# Patient Record
Sex: FEMALE | Race: WHITE
[De-identification: names, ages, dates, MRNs, and addresses within clinical notes are randomized per-mention and may not be internally consistent; named-entity substitution may affect disease eponyms.]

---

## 2018-11-06 ENCOUNTER — HOSPITAL ENCOUNTER (OUTPATIENT)
Dept: HOSPITAL 61 - PCVCIMAG | Age: 73
Discharge: HOME | End: 2018-11-06
Attending: INTERNAL MEDICINE
Payer: MEDICARE

## 2018-11-06 DIAGNOSIS — R06.00: ICD-10-CM

## 2018-11-06 DIAGNOSIS — I08.3: Primary | ICD-10-CM

## 2018-11-06 DIAGNOSIS — I48.0: ICD-10-CM

## 2018-11-06 DIAGNOSIS — Z87.891: ICD-10-CM

## 2018-11-06 DIAGNOSIS — R00.2: ICD-10-CM

## 2018-11-06 PROCEDURE — 78452 HT MUSCLE IMAGE SPECT MULT: CPT

## 2018-11-06 PROCEDURE — A9500 TC99M SESTAMIBI: HCPCS

## 2018-11-06 PROCEDURE — 93306 TTE W/DOPPLER COMPLETE: CPT

## 2018-11-06 PROCEDURE — 93017 CV STRESS TEST TRACING ONLY: CPT

## 2018-11-06 NOTE — PCVCIMAG
--------------- APPROVED REPORT --------------





Study performed:  11/06/2018 07:25:27



EXAM: Comprehensive 2D, Doppler, and color-flow 

Echocardiogram

Patient Location: Echo lab

Status:  routine



BSA:         2.06

HR: 67 bpmBP:          118/78 mmHg

Rhythm: NSR



Other Information 

Study Quality: Adequate



Risk Factors: 

Cardiac Risk Factors:  Hyperlipidemia



Indications

Dyspnea 

Palpitations



2D Dimensions

IVSd:  9.51 (7-11mm)

LVDd:  45.05 mm

PWd:  10.68 (7-11mm)

LVDs:  29.20 (25-40mm)

Left Atrium:  38.86 (27-40mm)

Aortic Root:  29.72 mm

LV Single Plane 4CH:  57.86 %

LV Single Plane 2CH:  56.25 %

Biplane EF:  57.4 %



Volumes

Left Atrial Volume (Systole)

Single Plane 4CH:  91.00 mLSingle Plane 2CH:  79.90 mL

LA ESV Index:  43.00 mL/m2



Aortic Valve

AoV Peak Diaz.:  1.39 m/s

AO Peak Gr.:  7.73 mmHgLVOT Max PG:  3.44 mmHg

LVOT Max V:  0.93 m/s



Mitral Valve

E/A Ratio:  1.6

MV Decel. Time:  229.96 ms

MV E Max Diaz.:  0.53 m/s

MV A Diaz.:  0.33 m/s

IVRT:  114.19 ms



Pulmonary Valve

PV Peak Diaz.:  1.00 m/sPV Peak Gr.:  4.03 mmHg



Pulmonary Vein

P Vein S:    0.29 m/sP Vein A:  0.27 m/s

P Vein D:   0.46 m/sP Vein A Dur.:  96.9 msec

P Vein S/D Ratio:  0.63



Tricuspid Valve

TR Peak Diaz.:  2.55 m/s

TR Peak Gr.:  26.07 mmHg



Left Ventricle

The left ventricle is normal size. There is normal LV segmental wall 

motion. There is normal left ventricular wall thickness. Left 

ventricular systolic function is normal. The left ventricular 

ejection fraction is within the normal range. LVEF is 55%. Grade II - 

pseudonormal filling dynamics.



Right Ventricle

Right ventricle is mildly dilated. The right ventricular systolic 

function is normal.



Atria

Left atrium is moderately dilated. Atrial septal aneurysm is present 

without PFO. Right atrium is mildly dilated.



Aortic Valve

Mild aortic valve sclerosis. Mild aortic regurgitation is present. 

There is no aortic valvular stenosis.



Mitral Valve

The mitral valve is normal in structure. There is mild mitral valve 

regurgitation noted. No evidence of mitral valve stenosis.



Tricuspid Valve

The tricuspid valve is normal in structure. Mild tricuspid 

regurgitation with PAP of 33 mmHg.



Pulmonic Valve

The pulmonary valve is normal in structure. There is mild pulmonic 

valvular regurgitation.



Great Vessels

The aortic root is normal in size. IVC is normal in size and 

collapses &gt;50% with inspiration.



Pericardium

There is no pericardial effusion. There is no pleural 

effusion.



&lt;Conclusion&gt;

The left ventricle is normal size.

LVEF is 55%.

Right ventricle is mildly dilated.

Left atrium is moderately dilated.

Right atrium is mildly dilated.

Atrial septal aneurysm is present without PFO.

Mild aortic valve sclerosis.

Mild aortic regurgitation is present.

The mitral valve is normal in structure.

The tricuspid valve is normal in structure.

Mild tricuspid regurgitation with PAP of 33 mmHg.

The pulmonary valve is normal in structure.

There is mild pulmonic valvular regurgitation.

There is no pericardial effusion.

## 2018-11-07 NOTE — PCVCIMAG
--------------- APPROVED REPORT --------------





Imaging Protocol: Rest Tc-99m/Stress Tc-99m 1 day

Study performed:  11/06/2018 09:27:15



Indication: Palpitations, Afib

Patient Location: Out-Patient

Stress Nurse: Alicia Bryan RN

NM Tech:Nickolas Vasquez NMTCB



Ht: 5 ft 6 in Wt: 225 lbs BSA:  2.10 m2

HR: 71 bpm                      BP: 120/66 mmHg         BMI:  

36.3

Rhythm:  Normal Sinus Rhythm



Medical History

Medical History: Age,Hyperlipidemia, Afib, Former Smoker



Resting Data

Rest SPECT myocardial perfusion imaging was performed in supine 

position 45 minutes following the intravenous injection of 11.9 mCi 

of Tc-99m Sestamibi.

Time of rest injection: 0830     Date: 11/06/2018

Administration Route: IV

Administration Site: Right AC



Pharmacologic Stress

Pharmacologic stress test was performed by injecting Regadenoson 0.4 

mg IV push over 10-15 seconds immediately followed by the intravenous 

injection of 30.9 mCi of Tc-99m Sestamibi.

Time of stress injection: 1015     Date: 11/06/2018

Administration Route: IV

Administration Site: Right AC

Gated Stress SPECT was performed 45 minutes after stress 

injection.

The images were gated to evaluate regional wall motion and calculate 

left ventricular ejection fraction. 



Stress Test Details

Stress Test:  Pharmacologic stress testing performed using 0.4 mg of 

regadenoson per 5 mL given IV over 10 seconds.

  Reason for pharmacologic stress test: Athritis in back and 

feet.



HRMax Heart Rate (APMHR): 147 bpm 

Resting HR:            71 bpmTarget HR (85% APMHR): 124 bpm

Max HR Achieved:  98 bpm

% of APMHR:         66

Recovery HR:            84 bpm



BP

Resting BP:  120/66 mmHg



Recovery BP:       113/55 mmHg

ECG

Resting ECG:  Normal Sinus Rhythm

Stress ECG:     Sinus Rhythm

Arrhythmia:    PAC's, PVC's

Recovery ECG: Sinus Rhythm



Clinical

Reason for Termination: Completed protocol

Stress Symptoms: Dyspnea

Symptoms resolved during recovery.



Stress ECG Conclusion

1. Adequate response to intravenous Lexiscan

2. Inadequate heart rate for ECG diagnosis



Study Data

Post stress, the left ventricular ejection was 71%..

SSS: 11

SRS: 9

SDS: 3

TID = 0.70.



Perfusion

There is a large area of moderately reduced uptake in the basal and 

mid segment of the inferior wall which is seen on the stress images 

as well as the resting images.

This area thickens and moves normally and is most consistent with 

attenuation artifact.



Nuclear Conclusion

ECG Findings: non-diagnostic 

Clinical Findings: negative for ischemia 

Nuclear Findings: negative for ischemia 

Exercise Capacity: not assessed

Left Ventricular Function: normal 

1. Low risk study 



Interpreted by:  Lexy Ronquillo MD

Electronically Approved: 11/07/2018 10:28:36



&lt;Conclusion&gt;

1. Adequate response to intravenous Lexiscan

2. Inadequate heart rate for ECG diagnosis

## 2018-11-27 ENCOUNTER — HOSPITAL ENCOUNTER (OUTPATIENT)
Dept: HOSPITAL 61 - PCVCCLINIC | Age: 73
Discharge: HOME | End: 2018-11-27
Attending: INTERNAL MEDICINE
Payer: MEDICARE

## 2018-11-27 DIAGNOSIS — Z87.891: ICD-10-CM

## 2018-11-27 DIAGNOSIS — E78.5: ICD-10-CM

## 2018-11-27 DIAGNOSIS — I48.0: Primary | ICD-10-CM

## 2018-11-27 DIAGNOSIS — R06.00: ICD-10-CM

## 2018-11-27 DIAGNOSIS — E03.9: ICD-10-CM

## 2018-11-27 PROCEDURE — G0463 HOSPITAL OUTPT CLINIC VISIT: HCPCS

## 2019-02-28 ENCOUNTER — HOSPITAL ENCOUNTER (OUTPATIENT)
Dept: HOSPITAL 61 - PCVCCLINIC | Age: 74
Discharge: HOME | End: 2019-02-28
Attending: INTERNAL MEDICINE
Payer: MEDICARE

## 2019-02-28 DIAGNOSIS — Z88.8: ICD-10-CM

## 2019-02-28 DIAGNOSIS — R06.00: ICD-10-CM

## 2019-02-28 DIAGNOSIS — Z87.891: ICD-10-CM

## 2019-02-28 DIAGNOSIS — E03.9: ICD-10-CM

## 2019-02-28 DIAGNOSIS — E78.5: ICD-10-CM

## 2019-02-28 DIAGNOSIS — E66.9: ICD-10-CM

## 2019-02-28 DIAGNOSIS — Z79.899: ICD-10-CM

## 2019-02-28 DIAGNOSIS — Z88.2: ICD-10-CM

## 2019-02-28 DIAGNOSIS — I48.0: Primary | ICD-10-CM

## 2019-02-28 PROCEDURE — G0463 HOSPITAL OUTPT CLINIC VISIT: HCPCS

## 2019-08-29 ENCOUNTER — HOSPITAL ENCOUNTER (OUTPATIENT)
Dept: HOSPITAL 61 - PCVCCLINIC | Age: 74
Discharge: HOME | End: 2019-08-29
Attending: INTERNAL MEDICINE
Payer: MEDICARE

## 2019-08-29 DIAGNOSIS — Z88.2: ICD-10-CM

## 2019-08-29 DIAGNOSIS — Z88.6: ICD-10-CM

## 2019-08-29 DIAGNOSIS — I48.0: Primary | ICD-10-CM

## 2019-08-29 DIAGNOSIS — R06.00: ICD-10-CM

## 2019-08-29 DIAGNOSIS — E78.5: ICD-10-CM

## 2019-08-29 PROCEDURE — G0463 HOSPITAL OUTPT CLINIC VISIT: HCPCS

## 2019-08-29 PROCEDURE — 93005 ELECTROCARDIOGRAM TRACING: CPT

## 2021-05-20 ENCOUNTER — HOSPITAL ENCOUNTER (OUTPATIENT)
Dept: HOSPITAL 35 - CAT | Age: 76
End: 2021-05-20
Attending: INTERNAL MEDICINE
Payer: COMMERCIAL

## 2021-05-20 DIAGNOSIS — G44.319: ICD-10-CM

## 2021-05-20 DIAGNOSIS — I67.82: Primary | ICD-10-CM

## 2021-09-07 ENCOUNTER — HOSPITAL ENCOUNTER (OUTPATIENT)
Dept: HOSPITAL 35 - SJCVC | Age: 76
End: 2021-09-07
Attending: INTERNAL MEDICINE
Payer: COMMERCIAL

## 2021-09-07 DIAGNOSIS — Z88.8: ICD-10-CM

## 2021-09-07 DIAGNOSIS — Z88.2: ICD-10-CM

## 2021-09-07 DIAGNOSIS — E66.9: ICD-10-CM

## 2021-09-07 DIAGNOSIS — Z79.899: ICD-10-CM

## 2021-09-07 DIAGNOSIS — E03.8: ICD-10-CM

## 2021-09-07 DIAGNOSIS — Z72.89: ICD-10-CM

## 2021-09-07 DIAGNOSIS — I48.0: Primary | ICD-10-CM

## 2021-09-07 DIAGNOSIS — E78.5: ICD-10-CM

## 2021-09-07 DIAGNOSIS — Z87.891: ICD-10-CM
